# Patient Record
Sex: FEMALE | Race: WHITE | Employment: OTHER | URBAN - METROPOLITAN AREA
[De-identification: names, ages, dates, MRNs, and addresses within clinical notes are randomized per-mention and may not be internally consistent; named-entity substitution may affect disease eponyms.]

---

## 2021-11-16 PROBLEM — M19.019 DEGENERATIVE JOINT DISEASE OF SHOULDER REGION: Status: ACTIVE | Noted: 2021-11-16

## 2021-11-16 PROBLEM — M75.01 ADHESIVE CAPSULITIS OF RIGHT SHOULDER: Status: ACTIVE | Noted: 2021-11-16

## 2021-11-16 PROBLEM — M25.511 RIGHT SHOULDER PAIN: Status: ACTIVE | Noted: 2021-11-16

## 2021-11-17 ENCOUNTER — OFFICE VISIT (OUTPATIENT)
Dept: ORTHOPEDIC SURGERY | Age: 66
End: 2021-11-17
Payer: MEDICARE

## 2021-11-17 VITALS — BODY MASS INDEX: 20.96 KG/M2 | WEIGHT: 111 LBS | HEIGHT: 61 IN

## 2021-11-17 DIAGNOSIS — M19.011 OSTEOARTHRITIS OF RIGHT SHOULDER, UNSPECIFIED OSTEOARTHRITIS TYPE: ICD-10-CM

## 2021-11-17 DIAGNOSIS — M25.519 SHOULDER PAIN, UNSPECIFIED CHRONICITY, UNSPECIFIED LATERALITY: Primary | ICD-10-CM

## 2021-11-17 PROCEDURE — G8427 DOCREV CUR MEDS BY ELIG CLIN: HCPCS | Performed by: ORTHOPAEDIC SURGERY

## 2021-11-17 PROCEDURE — 1090F PRES/ABSN URINE INCON ASSESS: CPT | Performed by: ORTHOPAEDIC SURGERY

## 2021-11-17 PROCEDURE — G8536 NO DOC ELDER MAL SCRN: HCPCS | Performed by: ORTHOPAEDIC SURGERY

## 2021-11-17 PROCEDURE — G8420 CALC BMI NORM PARAMETERS: HCPCS | Performed by: ORTHOPAEDIC SURGERY

## 2021-11-17 PROCEDURE — 1101F PT FALLS ASSESS-DOCD LE1/YR: CPT | Performed by: ORTHOPAEDIC SURGERY

## 2021-11-17 PROCEDURE — 20610 DRAIN/INJ JOINT/BURSA W/O US: CPT | Performed by: ORTHOPAEDIC SURGERY

## 2021-11-17 PROCEDURE — G8432 DEP SCR NOT DOC, RNG: HCPCS | Performed by: ORTHOPAEDIC SURGERY

## 2021-11-17 PROCEDURE — 3017F COLORECTAL CA SCREEN DOC REV: CPT | Performed by: ORTHOPAEDIC SURGERY

## 2021-11-17 PROCEDURE — 99214 OFFICE O/P EST MOD 30 MIN: CPT | Performed by: ORTHOPAEDIC SURGERY

## 2021-11-17 PROCEDURE — G8400 PT W/DXA NO RESULTS DOC: HCPCS | Performed by: ORTHOPAEDIC SURGERY

## 2021-11-17 RX ORDER — MELOXICAM 15 MG/1
15 TABLET ORAL DAILY
COMMUNITY
Start: 2021-03-10

## 2021-11-17 RX ORDER — METHYLPREDNISOLONE ACETATE 40 MG/ML
80 INJECTION, SUSPENSION INTRA-ARTICULAR; INTRALESIONAL; INTRAMUSCULAR; SOFT TISSUE ONCE
Status: COMPLETED | OUTPATIENT
Start: 2021-11-17 | End: 2021-11-17

## 2021-11-17 RX ORDER — LIDOCAINE HYDROCHLORIDE 10 MG/ML
1 INJECTION INFILTRATION; PERINEURAL ONCE
Status: COMPLETED | OUTPATIENT
Start: 2021-11-17 | End: 2021-11-17

## 2021-11-17 RX ORDER — DICLOFENAC POTASSIUM 50 MG/1
50 TABLET, FILM COATED ORAL 3 TIMES DAILY
COMMUNITY

## 2021-11-17 RX ORDER — DICLOFENAC SODIUM 75 MG/1
75 TABLET, DELAYED RELEASE ORAL 2 TIMES DAILY
Qty: 60 TABLET | Refills: 3 | Status: SHIPPED | OUTPATIENT
Start: 2021-11-17 | End: 2022-04-08

## 2021-11-17 RX ORDER — BUPIVACAINE HYDROCHLORIDE 2.5 MG/ML
1 INJECTION, SOLUTION INFILTRATION; PERINEURAL ONCE
Status: COMPLETED | OUTPATIENT
Start: 2021-11-17 | End: 2021-11-17

## 2021-11-17 RX ORDER — VALACYCLOVIR HYDROCHLORIDE 500 MG/1
TABLET, FILM COATED ORAL
COMMUNITY
Start: 2021-10-12

## 2021-11-17 RX ADMIN — METHYLPREDNISOLONE ACETATE 80 MG: 40 INJECTION, SUSPENSION INTRA-ARTICULAR; INTRALESIONAL; INTRAMUSCULAR; SOFT TISSUE at 21:00

## 2021-11-17 RX ADMIN — LIDOCAINE HYDROCHLORIDE 1 ML: 10 INJECTION INFILTRATION; PERINEURAL at 21:00

## 2021-11-17 RX ADMIN — BUPIVACAINE HYDROCHLORIDE 2.5 MG: 2.5 INJECTION, SOLUTION INFILTRATION; PERINEURAL at 21:00

## 2021-11-17 NOTE — PROGRESS NOTES
SUBJECTIVE/OBJECTIVE:  Danie Hamman (: 1955) is a 78 y.o. female, patient,here for evaluation of the other right shoulder. She has known right shoulder glenohumeral arthritis. She has tried an injection in the past.  She reports she continues to have stiffness and pain as well as grinding and clicking. She reports rest does make it somewhat better but any activity makes it worse. She works about insert a night. She denies any locking or giveaway. Physical Exam    Upon physical examination, the patient is well developed, well nourished, alert and oriented times three, with normal mood and affect and walks with a normal gait. Upon examination of the right shoulder, the patient sits with the scapula protracted and depressed. They are tender to palpation over the trapezius and rhomboids as well as the anterior aspect of the supraspinatus and biceps. They are non-tender to palpation over the neck, clavicle, scapula, and elbow. There is no soft tissue swelling and eccymosis. The patient has limited range of motion of the shoulder in all planes secondary to discomfort. The patient is unable to tolerate stability testing. They have 5/5 strength at their side, and are neurovascularly intact distally. There is no erythema, warmth or skin lesions present. On examination of the contralateral extremity, the patient is nontender to palpation and has excellent range of motion, stability and strength. Imaging:    I have reviewed the patient´s previous diagnostic tests in an effort to support the diagnosis and treatment options. ASSESSMENT/PLAN:  Below is the assessment and plan developed based on review of pertinent history, physical exam, labs, studies, and medications. 1. Shoulder pain, unspecified chronicity, unspecified laterality  -     PA DRAIN/INJECT LARGE JOINT/BURSA  2.  Osteoarthritis of right shoulder, unspecified osteoarthritis type  -     PA DRAIN/INJECT LARGE JOINT/BURSA      Return if symptoms worsen or fail to improve. After a long discussion regarding the patient's diagnosis and treatment options, we have decided to treat this condition conservatively. In the interim, they will continue to ice, elevate and take anti-inflammatories along with their home exercise program.  After discussing the options for treatment and the risks of injection, the patient wished to proceed with the injection to reduce pain and swelling. After a sterile prep, 3cc of lidocaine, 3cc of marcaine and 2cc of depo-medrol were injected into the right shoulder. The patient tolerated the procedure well and there were no complications. Post injection pain, blood sugar elevation, skin discoloration, fatty atrophy and the signs of infection were discussed in detail. The patient was instructed to contact us if there were any questions or concerns prior to their follow up appointment. We reviewed multiple exercises in the office for stretching. We document fact she needs to regain her full external rotation. I am happy to see her back in the future. Template medicine    Allergies   Allergen Reactions    Contrast Dye [Iodine] Not Reported This Time       Current Outpatient Medications   Medication Sig    meloxicam (MOBIC) 15 mg tablet Take 15 mg by mouth daily.  diclofenac potassium (CATAFLAM) 50 mg tablet Take 50 mg by mouth three (3) times daily.  dicyclomine HCl (DICYCLOMINE PO) Take  by mouth.  diclofenac EC (VOLTAREN) 75 mg EC tablet Take 1 Tablet by mouth two (2) times a day.  multivitamin (MULTIPLE VITAMIN PO) Take 1 Tablet by mouth daily.     valACYclovir (VALTREX) 500 mg tablet      Current Facility-Administered Medications   Medication    bupivacaine HCl (MARCAINE) 0.25 % (2.5 mg/mL) injection 2.5 mg    lidocaine (XYLOCAINE) 10 mg/mL (1 %) injection 1 mL    methylPREDNISolone acetate (DEPO-MEDROL) 40 mg/mL injection 80 mg       Past Medical History:   Diagnosis Date  Adhesive capsulitis of right shoulder 11/16/2021    Degenerative joint disease of shoulder region 11/16/2021    Right shoulder pain 11/16/2021       Past Surgical History:   Procedure Laterality Date    FOOT/TOES SURGERY PROC UNLISTED      HAND/FINGER SURGERY UNLISTED      HX HYSTERECTOMY         Family History   Problem Relation Age of Onset    Cancer Mother     Cancer Father     Cancer Sister        Social History     Socioeconomic History    Marital status:      Spouse name: Not on file    Number of children: Not on file    Years of education: Not on file    Highest education level: Not on file   Occupational History    Not on file   Tobacco Use    Smoking status: Former Smoker    Smokeless tobacco: Never Used   Vaping Use    Vaping Use: Never used   Substance and Sexual Activity    Alcohol use: No    Drug use: Never    Sexual activity: Not on file   Other Topics Concern    Not on file   Social History Narrative    Not on file     Social Determinants of Health     Financial Resource Strain:     Difficulty of Paying Living Expenses: Not on file   Food Insecurity:     Worried About Running Out of Food in the Last Year: Not on file    Fredo of Food in the Last Year: Not on file   Transportation Needs:     Lack of Transportation (Medical): Not on file    Lack of Transportation (Non-Medical):  Not on file   Physical Activity:     Days of Exercise per Week: Not on file    Minutes of Exercise per Session: Not on file   Stress:     Feeling of Stress : Not on file   Social Connections:     Frequency of Communication with Friends and Family: Not on file    Frequency of Social Gatherings with Friends and Family: Not on file    Attends Scientologist Services: Not on file    Active Member of Clubs or Organizations: Not on file    Attends Club or Organization Meetings: Not on file    Marital Status: Not on file   Intimate Partner Violence:     Fear of Current or Ex-Partner: Not on file    Emotionally Abused: Not on file    Physically Abused: Not on file    Sexually Abused: Not on file   Housing Stability:     Unable to Pay for Housing in the Last Year: Not on file    Number of Places Lived in the Last Year: Not on file    Unstable Housing in the Last Year: Not on file       Review of Systems    No flowsheet data found. Vitals:  Ht 5' 1\" (1.549 m)   Wt 111 lb (50.3 kg)   BMI 20.97 kg/m²    Body mass index is 20.97 kg/m². An electronic signature was used to authenticate this note.   -- Ihsan Jenkins MD

## 2021-12-30 ENCOUNTER — OFFICE VISIT (OUTPATIENT)
Dept: ORTHOPEDIC SURGERY | Age: 66
End: 2021-12-30
Payer: MEDICARE

## 2021-12-30 DIAGNOSIS — G57.62 MORTON'S NEUROMA OF THIRD INTERSPACE OF LEFT FOOT: ICD-10-CM

## 2021-12-30 DIAGNOSIS — Z98.890 HISTORY OF FOOT SURGERY: ICD-10-CM

## 2021-12-30 DIAGNOSIS — M79.672 CHRONIC FOOT PAIN, LEFT: ICD-10-CM

## 2021-12-30 DIAGNOSIS — M79.89 SWELLING OF LEFT FOOT: Primary | ICD-10-CM

## 2021-12-30 DIAGNOSIS — G89.29 CHRONIC FOOT PAIN, LEFT: ICD-10-CM

## 2021-12-30 PROCEDURE — 1090F PRES/ABSN URINE INCON ASSESS: CPT | Performed by: ORTHOPAEDIC SURGERY

## 2021-12-30 PROCEDURE — 3017F COLORECTAL CA SCREEN DOC REV: CPT | Performed by: ORTHOPAEDIC SURGERY

## 2021-12-30 PROCEDURE — 99202 OFFICE O/P NEW SF 15 MIN: CPT | Performed by: ORTHOPAEDIC SURGERY

## 2021-12-30 PROCEDURE — G8420 CALC BMI NORM PARAMETERS: HCPCS | Performed by: ORTHOPAEDIC SURGERY

## 2021-12-30 PROCEDURE — G8427 DOCREV CUR MEDS BY ELIG CLIN: HCPCS | Performed by: ORTHOPAEDIC SURGERY

## 2021-12-30 PROCEDURE — G8400 PT W/DXA NO RESULTS DOC: HCPCS | Performed by: ORTHOPAEDIC SURGERY

## 2021-12-30 PROCEDURE — 1101F PT FALLS ASSESS-DOCD LE1/YR: CPT | Performed by: ORTHOPAEDIC SURGERY

## 2021-12-30 PROCEDURE — G8536 NO DOC ELDER MAL SCRN: HCPCS | Performed by: ORTHOPAEDIC SURGERY

## 2021-12-30 PROCEDURE — G8432 DEP SCR NOT DOC, RNG: HCPCS | Performed by: ORTHOPAEDIC SURGERY

## 2021-12-30 NOTE — PROGRESS NOTES
Ramon Apgar (: 1955) is a 78 y.o. female, patient,here for evaluation of the following   Chief Complaint   Patient presents with    Foot Pain     s/p excision of Neuroma in left foot now feels like a wad of tissue is at the foot pad         ASSESSMENT/PLAN:  Below is the assessment and plan developed based on review of pertinent history, physical exam, labs, studies, and medications. 1. Swelling of left foot  -     XR STANDING FOOT LT MIN 3 V; Future  2. 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 Chronic foot pain, left  -     MRI FOOT LT WO CONT; Future  4. History of foot surgery  -     MRI FOOT LT WO CONT; Future      Main reason patient is here today is because of left foot numbness and swelling and states she had surgery in May 2021 by Dr. Gabriela Phipps. This was a surgery to excise a neuroma. She states she continues to have tingling sensation to the top of her foot. She did not bring any operative notes or other studies. I am not able to determine whether she has residual neuroma or not but I did recommend an MRI without contrast to further evaluate. At this point I have nothing to offer but to obtain new MRI to evaluate the foot which has already had surgical procedure. I did recommend appropriate shoe wear and insoles today. Return for after MRI complete. .      Allergies   Allergen Reactions    Contrast Dye [Iodine] Not Reported This Time       Current Outpatient Medications   Medication Sig    multivitamin (MULTIPLE VITAMIN PO) Take 1 Tablet by mouth daily.  meloxicam (MOBIC) 15 mg tablet Take 15 mg by mouth daily.  valACYclovir (VALTREX) 500 mg tablet     diclofenac potassium (CATAFLAM) 50 mg tablet Take 50 mg by mouth three (3) times daily.  dicyclomine HCl (DICYCLOMINE PO) Take  by mouth.  diclofenac EC (VOLTAREN) 75 mg EC tablet Take 1 Tablet by mouth two (2) times a day.      No current facility-administered medications for this visit. Past Medical History:   Diagnosis Date    Adhesive capsulitis of right shoulder 11/16/2021    Degenerative joint disease of shoulder region 11/16/2021    Right shoulder pain 11/16/2021       Past Surgical History:   Procedure Laterality Date    FOOT/TOES SURGERY PROC UNLISTED      HAND/FINGER SURGERY UNLISTED      HX HYSTERECTOMY         Family History   Problem Relation Age of Onset    Cancer Mother     Cancer Father     Cancer Sister        Social History     Socioeconomic History    Marital status:      Spouse name: Not on file    Number of children: Not on file    Years of education: Not on file    Highest education level: Not on file   Occupational History    Not on file   Tobacco Use    Smoking status: Former Smoker    Smokeless tobacco: Never Used   Vaping Use    Vaping Use: Never used   Substance and Sexual Activity    Alcohol use: No    Drug use: Never    Sexual activity: Not on file   Other Topics Concern    Not on file   Social History Narrative    Not on file     Social Determinants of Health     Financial Resource Strain:     Difficulty of Paying Living Expenses: Not on file   Food Insecurity:     Worried About [address] in the Last Year: Not on file    Fredo of Food in the Last Year: Not on file   Transportation Needs:     Lack of Transportation (Medical): Not on file    Lack of Transportation (Non-Medical):  Not on file   Physical Activity:     Days of Exercise per Week: Not on file    Minutes of Exercise per Session: Not on file   Stress:     Feeling of Stress : Not on file   Social Connections:     Frequency of Communication with Friends and Family: Not on file    Frequency of Social Gatherings with Friends and Family: Not on file    Attends Islam Services: Not on file    Active Member of Clubs or Organizations: Not on file    Attends Club or Organization Meetings: Not on file    Marital Status: Not on file   Intimate Partner Violence:     Fear of Current or Ex-Partner: Not on file    Emotionally Abused: Not on file    Physically Abused: Not on file    Sexually Abused: Not on file   Housing Stability:     Unable to Pay for Housing in the Last Year: Not on file    Number of Places Lived in the Last Year: Not on file    Unstable Housing in the Last Year: Not on file           Vitals: There were no vitals taken for this visit. There is no height or weight on file to calculate BMI. SUBJECTIVE/OBJECTIVE:  Faviola Riggins (: 1955)   New patient presents today with complaint of left foot pain states that it feels like she is walking on a cotton ball. She states she has swelling and tingling and numbing sensation is going up her leg. In 2021 she underwent surgical procedure by Dr. Zach Bermudez of [address] [place] [address]. She states she had a neuroma that was excised and now she describes tingling sensation to her foot. She states the pain is mild but it throbs and it comes and goes and there is the swelling and numbness and tingling described as above, standing, squatting, kneeling is causing problems as well as walking in general.  She states is getting worse despite elevation. She went back to see Dr. Lois Del Toro and states she was not satisfied with his recommendations. She is not diabetic, non-smoker. Physical Exam  Pleasant well-nourished female , alert and oriented to person, time and place, no acute distress. Mostly normal gait, satisfactory weightbearing stance. Left ankle: Full active and passive range of motion intact, nontender, no swelling, ligament stable. Tendons are intact including the Achilles tendon with a negative Johnson test, negative ankle squeeze test.  Slight tightness to dorsiflexion with knee extended.     Left foot: When compared to contralateral foot there is mild swelling but otherwise no malalignment or deformities, the toes are intact with excellent capillary refill, there is subjective sensation of changes to the area of incision but the tip of toe has satisfactory light touch sensation, there is diffuse tenderness to palpation, no obvious Linda's test positive, no obvious clicking sensation noted on exam at the site of lesion. No signs of infection. Contralateral foot and ankle exam, nontender, no swelling ligaments grossly stable. Normal weightbearing stance. Neurovascular exam intact for light touch sensation, cap refill, dorsalis pedis pulse palpable, flexion/extension strength 5/5. Skin intact without open wounds, lesions or ulcers, no skin discolorations, normal warmth to skin. Imaging:    XR Results (most recent):  Results from Appointment encounter on 12/30/21    XR STANDING FOOT LT MIN 3 V    Narrative  Left foot 3 views AP, lateral oblique show no acute fractures or dislocations. No significant malalignment or deformities, no acute abnormalities. Normal bone density. An electronic signature was used to authenticate this note.   -- Krystal Corea MD

## 2022-01-06 ENCOUNTER — HOSPITAL ENCOUNTER (OUTPATIENT)
Dept: MRI IMAGING | Age: 67
Discharge: HOME | End: 2022-01-06
Attending: ORTHOPAEDIC SURGERY
Payer: MEDICARE

## 2022-01-06 DIAGNOSIS — G89.29 CHRONIC FOOT PAIN, LEFT: ICD-10-CM

## 2022-01-06 DIAGNOSIS — G57.62 MORTON'S NEUROMA OF THIRD INTERSPACE OF LEFT FOOT: ICD-10-CM

## 2022-01-06 DIAGNOSIS — Z98.890 HISTORY OF FOOT SURGERY: ICD-10-CM

## 2022-01-06 DIAGNOSIS — M79.672 CHRONIC FOOT PAIN, LEFT: ICD-10-CM

## 2022-01-06 PROCEDURE — 73718 MRI LOWER EXTREMITY W/O DYE: CPT

## 2022-01-20 ENCOUNTER — OFFICE VISIT (OUTPATIENT)
Dept: ORTHOPEDIC SURGERY | Age: 67
End: 2022-01-20
Payer: MEDICARE

## 2022-01-20 DIAGNOSIS — M79.672 LEFT FOOT PAIN: Primary | ICD-10-CM

## 2022-01-20 DIAGNOSIS — Z98.890 HISTORY OF FOOT SURGERY: ICD-10-CM

## 2022-01-20 DIAGNOSIS — Z98.890 STATUS POST EXCISION OF MORTON'S NEUROMA: ICD-10-CM

## 2022-01-20 DIAGNOSIS — Z86.69 STATUS POST EXCISION OF MORTON'S NEUROMA: ICD-10-CM

## 2022-01-20 PROCEDURE — G8536 NO DOC ELDER MAL SCRN: HCPCS | Performed by: ORTHOPAEDIC SURGERY

## 2022-01-20 PROCEDURE — 1090F PRES/ABSN URINE INCON ASSESS: CPT | Performed by: ORTHOPAEDIC SURGERY

## 2022-01-20 PROCEDURE — 1101F PT FALLS ASSESS-DOCD LE1/YR: CPT | Performed by: ORTHOPAEDIC SURGERY

## 2022-01-20 PROCEDURE — 99212 OFFICE O/P EST SF 10 MIN: CPT | Performed by: ORTHOPAEDIC SURGERY

## 2022-01-20 PROCEDURE — G8400 PT W/DXA NO RESULTS DOC: HCPCS | Performed by: ORTHOPAEDIC SURGERY

## 2022-01-20 PROCEDURE — G8427 DOCREV CUR MEDS BY ELIG CLIN: HCPCS | Performed by: ORTHOPAEDIC SURGERY

## 2022-01-20 PROCEDURE — 3017F COLORECTAL CA SCREEN DOC REV: CPT | Performed by: ORTHOPAEDIC SURGERY

## 2022-01-20 PROCEDURE — G8420 CALC BMI NORM PARAMETERS: HCPCS | Performed by: ORTHOPAEDIC SURGERY

## 2022-01-20 PROCEDURE — G8432 DEP SCR NOT DOC, RNG: HCPCS | Performed by: ORTHOPAEDIC SURGERY

## 2022-01-20 NOTE — PROGRESS NOTES
Ranjan Howell (: 1955) is a 78 y.o. female, patient,here for evaluation of the following   Chief Complaint   Patient presents with    Follow-up     left foot MRI review         ASSESSMENT/PLAN:  Below is the assessment and plan developed based on review of pertinent history, physical exam, labs, studies, and medications. 1. Left foot pain  -     REFERRAL TO PHYSICAL THERAPY  2. Status post excision of Regalado's neuroma  3. History of foot surgery  -     REFERRAL TO PHYSICAL THERAPY      Patient back after MRI complete of the left foot, she had presented for evaluation of the left foot persistent pain stating that she feels she still has an Regalado's neuroma status post excision performed by Dr. Jose Mahoney. On my exam I felt there was nothing indicating the possibility of another Regalado's neuroma or persistent more Regalado's neuroma but possibly scar tissue and some underlying metatarsalgia. I had recommended shoewear modification and she has tried some of that. In addition, we went ahead and got an MRI to reconfirm that there is nothing more than likely scar tissue. MRI completed confirms that there is no MRI evidence of Regalado's neuroma. I did recommend continued use of appropriate shoes and insoles, I do not recommend any other surgeries at this time. I recommended gait training, strength and balance and referred to physical therapy. Return if symptoms worsen or fail to improve. Allergies   Allergen Reactions    Contrast Dye [Iodine] Not Reported This Time       Current Outpatient Medications   Medication Sig    multivitamin (MULTIPLE VITAMIN PO) Take 1 Tablet by mouth daily.  meloxicam (MOBIC) 15 mg tablet Take 15 mg by mouth daily.  valACYclovir (VALTREX) 500 mg tablet     diclofenac potassium (CATAFLAM) 50 mg tablet Take 50 mg by mouth three (3) times daily.  dicyclomine HCl (DICYCLOMINE PO) Take  by mouth.     diclofenac EC (VOLTAREN) 75 mg EC tablet Take 1 Tablet by mouth two (2) times a day. No current facility-administered medications for this visit. Past Medical History:   Diagnosis Date    Adhesive capsulitis of right shoulder 11/16/2021    Degenerative joint disease of shoulder region 11/16/2021    Right shoulder pain 11/16/2021       Past Surgical History:   Procedure Laterality Date    FOOT/TOES SURGERY PROC UNLISTED      HAND/FINGER SURGERY UNLISTED      HX HYSTERECTOMY         Family History   Problem Relation Age of Onset    Cancer Mother     Cancer Father     Cancer Sister        Social History     Socioeconomic History    Marital status:      Spouse name: Not on file    Number of children: Not on file    Years of education: Not on file    Highest education level: Not on file   Occupational History    Not on file   Tobacco Use    Smoking status: Former Smoker    Smokeless tobacco: Never Used   Vaping Use    Vaping Use: Never used   Substance and Sexual Activity    Alcohol use: No    Drug use: Never    Sexual activity: Not on file   Other Topics Concern    Not on file   Social History Narrative    Not on file     Social Determinants of Health     Financial Resource Strain:     Difficulty of Paying Living Expenses: Not on file   Food Insecurity:     Worried About [address] in the Last Year: Not on file    Fredo of Food in the Last Year: Not on file   Transportation Needs:     Lack of Transportation (Medical): Not on file    Lack of Transportation (Non-Medical):  Not on file   Physical Activity:     Days of Exercise per Week: Not on file    Minutes of Exercise per Session: Not on file   Stress:     Feeling of Stress : Not on file   Social Connections:     Frequency of Communication with Friends and Family: Not on file    Frequency of Social Gatherings with Friends and Family: Not on file    Attends Gnosticism Services: Not on file    Active Member of Clubs or Organizations: Not on file    Attends Club or Organization Meetings: Not on file    Marital Status: Not on file   Intimate Partner Violence:     Fear of Current or Ex-Partner: Not on file    Emotionally Abused: Not on file    Physically Abused: Not on file    Sexually Abused: Not on file   Housing Stability:     Unable to Pay for Housing in the Last Year: Not on file    Number of [place] in the Last Year: Not on file    Unstable Housing in the Last Year: Not on file           Vitals: There were no vitals taken for this visit. There is no height or weight on file to calculate BMI. SUBJECTIVE/OBJECTIVE:  Ninoska Jha (: 1955)   Patient back after MRI left foot, for chronic pain status post history of Regalado's neuroma excision by another surgeon. Examination was otherwise normal after procedure, she had a lot of pain that was persistent at the third webspace where she had surgical procedure, felt the possibility of scar tissue but because of the amount of pain she described, I recommended MRI to reconfirm. Otherwise she states she is doing better with some shoewear modifications and different types of insoles. Physical Exam  Pleasant well-nourished female , alert and oriented to person, time and place, no acute distress. Mostly normal gait, satisfactory weightbearing stance. Left ankle: Nontender, no swelling, normal exam.    Left foot: There is still tenderness around the third webspace, negative Linda's test, incision well-healed, no signs of infection. No soft tissue swelling. No malalignment or deformity to the foot. Dorsalis pedis pulse palpable, light touch sensation intact capillary refill is brisk. Contralateral foot and ankle exam, nontender, no swelling ligaments grossly stable. Normal weightbearing stance. Neurovascular exam intact for light touch sensation, cap refill, dorsalis pedis pulse palpable, flexion/extension strength 5/5.     Skin intact without open wounds, lesions or ulcers, no skin discolorations, normal warmth to skin. Imaging:    No x-rays obtained but reviewed the MRI without contrast dated January 6, 2022. 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     CONTRAST: None.     FINDINGS:      Bone marrow:  No fracture, dislocation, or marrow replacing process. No evidence  of stress reaction or periostitis.     Joint fluid: None.     Tendons: Intact.     Muscles: Within normal limits.     Neurovascular bundles: Within normal limits. No evidence of Regalado neuroma.     Articular cartilage: Mild osteoarthritis. No osteochondral lesion.     Soft tissue mass: None.     IMPRESSION  No MRI evidence of Regalado neuroma. An electronic signature was used to authenticate this note.   -- Madeline Isbell MD

## 2022-01-24 ENCOUNTER — TELEPHONE (OUTPATIENT)
Dept: ORTHOPEDIC SURGERY | Age: 67
End: 2022-01-24

## 2022-01-24 NOTE — TELEPHONE ENCOUNTER
Pharmacist phoned office stating that patient contacted them wanting a refill on her Diclofenac 75mg. This prescription was refilled verbally with the pharmacist. 1 tablet BID #[id number] with no refills.

## 2022-03-18 PROBLEM — M25.511 RIGHT SHOULDER PAIN: Status: ACTIVE | Noted: 2021-11-16

## 2022-03-19 PROBLEM — M75.01 ADHESIVE CAPSULITIS OF RIGHT SHOULDER: Status: ACTIVE | Noted: 2021-11-16

## 2022-03-20 PROBLEM — M19.019 DEGENERATIVE JOINT DISEASE OF SHOULDER REGION: Status: ACTIVE | Noted: 2021-11-16

## 2022-04-08 RX ORDER — DICLOFENAC SODIUM 75 MG/1
TABLET, DELAYED RELEASE ORAL
Qty: 180 TABLET | Refills: 0 | Status: SHIPPED | OUTPATIENT
Start: 2022-04-08 | End: 2022-06-22

## 2022-06-22 RX ORDER — DICLOFENAC SODIUM 75 MG/1
TABLET, DELAYED RELEASE ORAL
Qty: 180 TABLET | Refills: 0 | Status: SHIPPED | OUTPATIENT
Start: 2022-06-22

## 2022-10-03 ENCOUNTER — TRANSCRIBE ORDER (OUTPATIENT)
Dept: SCHEDULING | Age: 67
End: 2022-10-03

## 2022-10-03 DIAGNOSIS — M41.25 IDIOPATHIC SCOLIOSIS OF THORACOLUMBAR REGION: ICD-10-CM

## 2022-10-03 DIAGNOSIS — M43.16 SPONDYLOLISTHESIS OF LUMBAR REGION: ICD-10-CM

## 2022-10-03 DIAGNOSIS — M54.50 LUMBAR PAIN: ICD-10-CM

## 2022-10-03 DIAGNOSIS — M54.16 LUMBAR RADICULITIS: Primary | ICD-10-CM

## 2023-03-01 ENCOUNTER — TRANSCRIBE ORDER (OUTPATIENT)
Dept: SCHEDULING | Age: 68
End: 2023-03-01

## 2023-03-01 DIAGNOSIS — Z00.00 ROUTINE GENERAL MEDICAL EXAMINATION AT A HEALTH CARE FACILITY: Primary | ICD-10-CM

## 2023-03-04 ENCOUNTER — HOSPITAL ENCOUNTER (OUTPATIENT)
Dept: CT IMAGING | Age: 68
Discharge: HOME | End: 2023-03-04
Attending: SPECIALIST
Payer: SELF-PAY

## 2023-03-04 DIAGNOSIS — Z00.00 ROUTINE GENERAL MEDICAL EXAMINATION AT A HEALTH CARE FACILITY: ICD-10-CM

## 2023-03-04 PROCEDURE — 75571 CT HRT W/O DYE W/CA TEST: CPT

## 2023-03-14 ENCOUNTER — TRANSCRIBE ORDER (OUTPATIENT)
Dept: SCHEDULING | Age: 68
End: 2023-03-14

## 2023-03-14 DIAGNOSIS — R91.1 RIGHT LOWER LOBE PULMONARY NODULE: Primary | ICD-10-CM

## 2023-03-17 ENCOUNTER — TRANSCRIBE ORDER (OUTPATIENT)
Dept: SCHEDULING | Age: 68
End: 2023-03-17

## 2023-03-17 DIAGNOSIS — R91.1 LUNG NODULE: Primary | ICD-10-CM

## 2023-03-22 ENCOUNTER — HOSPITAL ENCOUNTER (OUTPATIENT)
Dept: CT IMAGING | Age: 68
Discharge: HOME | End: 2023-03-22
Attending: FAMILY MEDICINE
Payer: MEDICARE

## 2023-03-22 DIAGNOSIS — R91.1 LUNG NODULE: ICD-10-CM

## 2023-03-22 PROCEDURE — 71250 CT THORAX DX C-: CPT

## 2024-11-06 ENCOUNTER — HOSPITAL ENCOUNTER (OUTPATIENT)
Facility: HOSPITAL | Age: 69
Discharge: HOME | End: 2024-11-09
Payer: MEDICARE

## 2024-11-06 DIAGNOSIS — R91.1 LUNG NODULE: ICD-10-CM

## 2024-11-06 PROCEDURE — 71250 CT THORAX DX C-: CPT

## 2025-02-15 ENCOUNTER — HOSPITAL ENCOUNTER (OUTPATIENT)
Facility: HOSPITAL | Age: 70
Discharge: HOME | End: 2025-02-18
Attending: PHYSICAL MEDICINE & REHABILITATION
Payer: MEDICARE

## 2025-02-15 DIAGNOSIS — M54.16 LUMBAR RADICULOPATHY: ICD-10-CM

## 2025-02-15 DIAGNOSIS — M47.816 LUMBAR SPONDYLOSIS: ICD-10-CM

## 2025-02-15 DIAGNOSIS — M43.16 SPONDYLOLISTHESIS OF LUMBAR REGION: ICD-10-CM

## 2025-02-15 PROCEDURE — 72148 MRI LUMBAR SPINE W/O DYE: CPT
